# Patient Record
Sex: MALE | Race: WHITE | NOT HISPANIC OR LATINO | Employment: FULL TIME | URBAN - METROPOLITAN AREA
[De-identification: names, ages, dates, MRNs, and addresses within clinical notes are randomized per-mention and may not be internally consistent; named-entity substitution may affect disease eponyms.]

---

## 2022-12-02 ENCOUNTER — OFFICE VISIT (OUTPATIENT)
Dept: ORTHOPEDICS | Facility: CLINIC | Age: 42
End: 2022-12-02
Payer: COMMERCIAL

## 2022-12-02 ENCOUNTER — ANCILLARY PROCEDURE (OUTPATIENT)
Dept: GENERAL RADIOLOGY | Facility: CLINIC | Age: 42
End: 2022-12-02
Attending: PEDIATRICS
Payer: COMMERCIAL

## 2022-12-02 VITALS
SYSTOLIC BLOOD PRESSURE: 130 MMHG | DIASTOLIC BLOOD PRESSURE: 84 MMHG | BODY MASS INDEX: 23.1 KG/M2 | WEIGHT: 180 LBS | HEIGHT: 74 IN

## 2022-12-02 DIAGNOSIS — M79.645 FINGER PAIN, LEFT: ICD-10-CM

## 2022-12-02 DIAGNOSIS — S62.655A CLOSED NONDISPLACED FRACTURE OF MIDDLE PHALANX OF LEFT RING FINGER, INITIAL ENCOUNTER: ICD-10-CM

## 2022-12-02 DIAGNOSIS — M79.645 FINGER PAIN, LEFT: Primary | ICD-10-CM

## 2022-12-02 PROCEDURE — 99203 OFFICE O/P NEW LOW 30 MIN: CPT | Performed by: PEDIATRICS

## 2022-12-02 PROCEDURE — 73140 X-RAY EXAM OF FINGER(S): CPT | Mod: TC | Performed by: RADIOLOGY

## 2022-12-02 NOTE — PROGRESS NOTES
ASSESSMENT & PLAN    Jared was seen today for pain.    Diagnoses and all orders for this visit:    Finger pain, left  -     XR Finger Left G/E 2 Views; Future    Closed nondisplaced fracture of middle phalanx of left ring finger, initial encounter      Already 1 week out from injury unsupported with nondisplaced fracture.  Discussed potential for joint stiffness, arthrosis in future.  Plan support for finger, with recheck in 2-3 weeks.  Questions answered. Discussed signs and symptoms that may indicate more serious issues; the patient was instructed to seek appropriate care if noted. Jared indicates understanding of these issues and agrees with the plan.        See Patient Instructions  Patient Instructions   Interestingly, the left ring finger x-rays today demonstrate nondisplaced fracture at the volar base of the middle phalanx of the ring finger.  Suspect this happened with tossing out the trash.  Discussed potential for healing and improvement with time, though note that some joint stiffness and swelling can remain, and there is future potential for joint arthritis.  Discussed use of splint to help protect the finger over the next couple of weeks.  Preet taping is a reasonable alternative, but is going to be more active with your hand, such as when at work, would suggest using the splint.  Provided a letter regarding work.  We discussed tentative recheck in 2 to 3 weeks, with repeat x-rays (more of a clinical assessment, but also some radiographic assessment at that time).      If you have any further questions for your physician or physician s care team you can contact them thru MyChart or by calling  [phone] and use option 3 to leave a voice message.   Messages received during business hours will be returned same day.          Blu Turner [facility]    -----  Chief Complaint   Patient presents with     Left Ring Finger - Pain       SUBJECTIVE  Jared Cantu  "is a/an 42 year old male who is seen as a self referral for evaluation of left ring finger pain.  Pain has been present for about 1 week.  Started out as an ache, and then when he went to grab something he had an increase in pain.   Pain and swelling over the PIP joint.  Unable to get his ring off.   Pain with flexion, and with lateral motions.    Has a HX of gout in right hand, in his thumb.  .     The patient is seen by themselves.  The patient is Right handed    Onset: 1 week(s) ago. Reports insidious onset without acute precipitating event.  Location of Pain: left ring finger PIP joint   Worsened by: use, flexion, lateral motions   Better with: rest   Treatments tried: rest/activity avoidance and ice  Associated symptoms: numbness    Orthopedic/Surgical history: NO  Social History/Occupation: manufacturing.          **  Initially not sure how injury could have happened. However, thinks injury may have come from when taking trash out night of onset of pain, carrying with left hand, pulling sensation of string from the trash can. Otherwise no injury--didn't fall, didn't slip, didn't hit anything.    History gout right hand, so questioned initially whether potentially could be that in left hand. However, course with this ring finger injury is different from past gout flares.    REVIEW OF SYSTEMS:  Review of Systems      OBJECTIVE:  /84   Ht 1.88 m (6' 2\")   Wt 81.6 kg (180 lb)   BMI 23.11 kg/m     General: healthy, alert and in no distress  HEENT: no scleral icterus or conjunctival erythema  Skin: no suspicious lesions or rash. No jaundice.  CV: distal perfusion intact   Resp: normal respiratory effort without conversational dyspnea   Psych: normal mood and affect  Gait: NORMAL  Neuro: Normal light sensory exam of extremity       Hand/wrist (left):    Inspection:  No deformity noted.  Mild-mod swelling ring finger PIP joint.    Motion:  Digit motion mod limitation composite flexion ring finger, stiffness " and some pain noted PIP joint; extension nearly full, mild stiffness noted  No rotational deformity    Strength:  deferred    Sensation:  Grossly intact light touch.    Radial pulses normal, +2/4, capillary refill brisk.    Palpation:  Tender palmar aspect PIP joint ring finger  Nontender remainder, including other aspects PIP joint ring finger      RADIOLOGY:  I independently ordered, visualized and reviewed these images with the patient  Lucency volar base middle phalanx finger finger consistent with nondisplaced intra-articular fracture. Soft tissue swelling about the PIP joint ring finger.    Recent Results (from the past 24 hour(s))   XR Finger Left G/E 2 Views    Narrative    XR FINGER LEFT G/E 2 VIEWS 12/2/2022 1:31 PM    HISTORY: Finger pain, left    COMPARISON: None.      Impression    IMPRESSION: Small nondisplaced fracture of the volar aspect of the  base of the middle phalanx of the left ring finger with mild  surrounding soft tissue swelling.    VAZQUEZ ANTONIO MD         SYSTEM ID:  [id number]

## 2022-12-02 NOTE — LETTER
12/2/2022         RE: Jared Cantu  [address]  [place] [address]        Dear Colleague,    Thank you for referring your patient, Jared Cantu, to the [facility] [place]. Please see a copy of my visit note below.    ASSESSMENT & PLAN    Jared was seen today for pain.    Diagnoses and all orders for this visit:    Finger pain, left  -     XR Finger Left G/E 2 Views; Future    Closed nondisplaced fracture of middle phalanx of left ring finger, initial encounter      Already 1 week out from injury unsupported with nondisplaced fracture.  Discussed potential for joint stiffness, arthrosis in future.  Plan support for finger, with recheck in 2-3 weeks.  Questions answered. Discussed signs and symptoms that may indicate more serious issues; the patient was instructed to seek appropriate care if noted. Jared indicates understanding of these issues and agrees with the plan.        See Patient Instructions  Patient Instructions   Interestingly, the left ring finger x-rays today demonstrate nondisplaced fracture at the volar base of the middle phalanx of the ring finger.  Suspect this happened with tossing out the trash.  Discussed potential for healing and improvement with time, though note that some joint stiffness and swelling can remain, and there is future potential for joint arthritis.  Discussed use of splint to help protect the finger over the next couple of weeks.  Preet taping is a reasonable alternative, but is going to be more active with your hand, such as when at work, would suggest using the splint.  Provided a letter regarding work.  We discussed tentative recheck in 2 to 3 weeks, with repeat x-rays (more of a clinical assessment, but also some radiographic assessment at that time).      If you have any further questions for your physician or physician s care team you can contact them thru [employer] or by calling  [phone] and use option 3 to leave a voice  "message.   Messages received during business hours will be returned same day.          Blu Turner DO  [facility]    -----  Chief Complaint   Patient presents with     Left Ring Finger - Pain       SUBJECTIVE  Jared Cantu is a/an 42 year old male who is seen as a self referral for evaluation of left ring finger pain.  Pain has been present for about 1 week.  Started out as an ache, and then when he went to grab something he had an increase in pain.   Pain and swelling over the PIP joint.  Unable to get his ring off.   Pain with flexion, and with lateral motions.    Has a HX of gout in right hand, in his thumb.  .     The patient is seen by themselves.  The patient is Right handed    Onset: 1 week(s) ago. Reports insidious onset without acute precipitating event.  Location of Pain: left ring finger PIP joint   Worsened by: use, flexion, lateral motions   Better with: rest   Treatments tried: rest/activity avoidance and ice  Associated symptoms: numbness    Orthopedic/Surgical history: NO  Social History/Occupation: manufacturing.          **  Initially not sure how injury could have happened. However, thinks injury may have come from when taking trash out night of onset of pain, carrying with left hand, pulling sensation of string from the trash can. Otherwise no injury--didn't fall, didn't slip, didn't hit anything.    History gout right hand, so questioned initially whether potentially could be that in left hand. However, course with this ring finger injury is different from past gout flares.    REVIEW OF SYSTEMS:  Review of Systems      OBJECTIVE:  /84   Ht 1.88 m (6' 2\")   Wt 81.6 kg (180 lb)   BMI 23.11 kg/m     General: healthy, alert and in no distress  HEENT: no scleral icterus or conjunctival erythema  Skin: no suspicious lesions or rash. No jaundice.  CV: distal perfusion intact   Resp: normal respiratory effort without conversational dyspnea "   Psych: normal mood and affect  Gait: NORMAL  Neuro: Normal light sensory exam of extremity       Hand/wrist (left):    Inspection:  No deformity noted.  Mild-mod swelling ring finger PIP joint.    Motion:  Digit motion mod limitation composite flexion ring finger, stiffness and some pain noted PIP joint; extension nearly full, mild stiffness noted  No rotational deformity    Strength:  deferred    Sensation:  Grossly intact light touch.    Radial pulses normal, +2/4, capillary refill brisk.    Palpation:  Tender palmar aspect PIP joint ring finger  Nontender remainder, including other aspects PIP joint ring finger      RADIOLOGY:  I independently ordered, visualized and reviewed these images with the patient  Lucency volar base middle phalanx finger finger consistent with nondisplaced intra-articular fracture. Soft tissue swelling about the PIP joint ring finger.    Recent Results (from the past 24 hour(s))   XR Finger Left G/E 2 Views    Narrative    XR FINGER LEFT G/E 2 VIEWS 12/2/2022 1:31 PM    HISTORY: Finger pain, left    COMPARISON: None.      Impression    IMPRESSION: Small nondisplaced fracture of the volar aspect of the  base of the middle phalanx of the left ring finger with mild  surrounding soft tissue swelling.    VAZQUEZ ANTONIO MD         SYSTEM ID:  [id number]               Again, thank you for allowing me to participate in the care of your patient.        Sincerely,        Blu Turner DO

## 2022-12-02 NOTE — PATIENT INSTRUCTIONS
Interestingly, the left ring finger x-rays today demonstrate nondisplaced fracture at the volar base of the middle phalanx of the ring finger.  Suspect this happened with tossing out the trash.  Discussed potential for healing and improvement with time, though note that some joint stiffness and swelling can remain, and there is future potential for joint arthritis.  Discussed use of splint to help protect the finger over the next couple of weeks.  Preet taping is a reasonable alternative, but is going to be more active with your hand, such as when at work, would suggest using the splint.  Provided a letter regarding work.  We discussed tentative recheck in 2 to 3 weeks, with repeat x-rays (more of a clinical assessment, but also some radiographic assessment at that time).      If you have any further questions for your physician or physician s care team you can contact them thru [employer] or by calling  [phone] and use option 3 to leave a voice message.   Messages received during business hours will be returned same day.

## 2022-12-02 NOTE — PROGRESS NOTES
"ASSESSMENT & PLAN    Jared was seen today for pain.    Diagnoses and all orders for this visit:    Finger pain, left  -     XR Finger Left G/E 2 Views; Future      This issue is {ACUTE/CHRONIC:146597} and {IMPROVING WORSENIN}.      {FOLLOW UP PLANS (Optional):997746}    DO [facility]    -----  Chief Complaint   Patient presents with     Left Ring Finger - Pain       SUBJECTIVE  Jared Cantu is a/an 42 year old male who is seen {FSOC CONSULT:427117} for evaluation of ***.     The patient is seen {sjaparent:980196}.  The patient is {HANDDOMINANCE:379764} handed    Onset: {sjadays/weeks/months/years:449256}. {Precipitating Event:312062}  Location of Pain: {side:5002} ***  Worsened by: ***  Better with: ***  Treatments tried: {sjatreatmentoptions:624917}  Associated symptoms: {thassociatedsx:628682}    Orthopedic/Surgical history: {YES - DATE/NO:452192::\"NO\"}  Social History/Occupation: ***    No family history pertinent to patient's problem today. ***    REVIEW OF SYSTEMS:  Review of Systems  {ROS COMP (Optional):395637}    OBJECTIVE:  /84   Ht 1.88 m (6' 2\")   Wt 81.6 kg (180 lb)   BMI 23.11 kg/m     General: healthy, alert and in no distress  HEENT: no scleral icterus or conjunctival erythema  Skin: no suspicious lesions or rash. No jaundice.  CV: distal perfusion intact ***  Resp: normal respiratory effort without conversational dyspnea   Psych: normal mood and affect  Gait: {FSOC GAIT:891026::\"NORMAL\"}  Neuro: Normal light sensory exam of *** extremity ***    ***     RADIOLOGY:  I independently *** ordered, visualized and reviewed these images with the patient  ***      {[facility]  Documentation (Optional):250301}  { E&M time (Optional):836419}  {Provider  Link to [facility] Help Grid :972264}       "

## 2022-12-02 NOTE — LETTER
[facility]  [address]  [place] [address]  Phone: [phone]  Fax: [phone]      December 2, 2022      RE: Jared Cantu  [address]   [place] [address]        To whom it may concern:    Jared Cantu was seen in clinic today. The employee is ABLE to return to work next scheduled work date.    When the patient returns to work, the following restrictions apply for 3 weeks:  Reach Above Shoulders: Not at all (0 hours) on left  Lift, carry, push, and pull: up to 5 lbs on left, avoiding doing so repetitively. May use left hand as helper for right if pain free.      Sincerely,            Blu Turner DO, CAQ

## 2023-01-06 ENCOUNTER — ANCILLARY PROCEDURE (OUTPATIENT)
Dept: GENERAL RADIOLOGY | Facility: CLINIC | Age: 43
End: 2023-01-06
Attending: PEDIATRICS
Payer: COMMERCIAL

## 2023-01-06 ENCOUNTER — OFFICE VISIT (OUTPATIENT)
Dept: ORTHOPEDICS | Facility: CLINIC | Age: 43
End: 2023-01-06
Payer: COMMERCIAL

## 2023-01-06 VITALS — HEART RATE: 68 BPM | DIASTOLIC BLOOD PRESSURE: 66 MMHG | SYSTOLIC BLOOD PRESSURE: 116 MMHG

## 2023-01-06 DIAGNOSIS — S62.655D CLOSED NONDISPLACED FRACTURE OF MIDDLE PHALANX OF LEFT RING FINGER WITH ROUTINE HEALING, SUBSEQUENT ENCOUNTER: Primary | ICD-10-CM

## 2023-01-06 PROCEDURE — 73140 X-RAY EXAM OF FINGER(S): CPT | Mod: TC | Performed by: RADIOLOGY

## 2023-01-06 PROCEDURE — 99213 OFFICE O/P EST LOW 20 MIN: CPT | Performed by: PEDIATRICS

## 2023-01-06 ASSESSMENT — PAIN SCALES - GENERAL: PAINLEVEL: NO PAIN (0)

## 2023-01-06 NOTE — PROGRESS NOTES
ASSESSMENT & PLAN    Diagnoses and all orders for this visit:    Closed nondisplaced fracture of middle phalanx of left ring finger with routine healing, subsequent encounter  -     XR Finger LT G/E 2 vw          See Patient Instructions  Patient Instructions   Good to hear of the improvement that you have had.  Not surprising still to have swelling and limited mobility in this area, given nature of the injury.  X-rays of the finger today show once again fracture at the volar base of the middle phalanx of the ring finger.  Fracture lucency is better seen today, which could represent minimal interval displacement, though I do not think overall positioning has significantly changed.  With this injury, anticipate ongoing joint swelling, and there may be persistent stiffness in the finger, as well as future development of arthritis.  However, given improvement, we discussed johnathan taping for support with activities as desired.  Discussed rehab approach; home exercises reviewed today.  If interested in referral to hand therapy, contact clinic.  Plan to monitor 3 to 4 weeks additionally, with working on exercises.  Contact clinic if any questions or concerns in the meantime.    If you have any further questions for your physician or physician s care team you can contact them thru MyChart or by calling  [phone] and use option 3 to leave a voice message.   Messages received during business hours will be returned same day.          [facility]    SUBJECTIVE- Interim History January 6, 2023    No chief complaint on file.      Jared Cantu is a 42 year old male who is seen in f/u up for Left ringer finger. Since last visit on 12/2/2022 patient has  No new injury.  - Now ~ 6 weeks from initial onset    Worsened by: bumping it  Better with: johnathan taping  Treatments tried: casting/splinting/bracing  Associated symptoms:  swelling    The patient is Right  handed    Orthopedic/Surgical history: No  Social History/Occupation: manufactoring    No family history pertinent to patient's problem today.    **  Overall pain improved compared to last visit.  Splinted for period of time.   Still with limited flexion at ring finger, with persistent swelling.  Here for follow-up today.    REVIEW OF SYSTEMS:  Review of Systems      OBJECTIVE:  /66   Pulse 68        Hand/wrist (left):    Inspection:  No deformity noted.  Mild-mod swelling ring finger PIP joint.    Motion:  Digit motion mild-mod limitation composite flexion ring finger, stiffness and some pain noted PIP joint; extension grossly intact  No rotational deformity    Strength:  deferred          RADIOLOGY:  Final results and radiologist's interpretation, available in the [facility] health record.  Images were reviewed with the patient in the office today.  My personal interpretation of the performed imaging: again note fracture volar base middle phalanx. Appears some subtle signs of healing along fracture site, but lucency remains visible.      XR Finger LT G/E 2 vw    Narrative    FINGER LEFT TWO OR MORE VIEWS   1/6/2023 10:25 AM     HISTORY:  Closed nondisplaced fracture of middle phalanx of left ring  finger, initial encounter.    COMPARISON: Radiographs from 12/2/2022.      Impression    IMPRESSION: The minimally displaced fracture of the base of middle  phalanx of the ring finger and the adjacent soft tissue swelling are  similar in appearance when compared to the prior study. It is unclear  whether or not there is bridging callus/bone.     YANETH SELLERS MD         SYSTEM ID:  [id number]

## 2023-01-06 NOTE — LETTER
1/6/2023         RE: Jared Cantu  [address]  [place] [address]        Dear Colleague,    Thank you for referring your patient, Jared Cantu, to the [facility]. Please see a copy of my visit note below.    ASSESSMENT & PLAN    Diagnoses and all orders for this visit:    Closed nondisplaced fracture of middle phalanx of left ring finger with routine healing, subsequent encounter  -     XR Finger LT G/E 2 vw          See Patient Instructions  Patient Instructions   Good to hear of the improvement that you have had.  Not surprising still to have swelling and limited mobility in this area, given nature of the injury.  X-rays of the finger today show once again fracture at the volar base of the middle phalanx of the ring finger.  Fracture lucency is better seen today, which could represent minimal interval displacement, though I do not think overall positioning has significantly changed.  With this injury, anticipate ongoing joint swelling, and there may be persistent stiffness in the finger, as well as future development of arthritis.  However, given improvement, we discussed johnathan taping for support with activities as desired.  Discussed rehab approach; home exercises reviewed today.  If interested in referral to hand therapy, contact clinic.  Plan to monitor 3 to 4 weeks additionally, with working on exercises.  Contact clinic if any questions or concerns in the meantime.    If you have any further questions for your physician or physician s care team you can contact them thru MyChart or by calling  [phone] and use option 3 to leave a voice message.   Messages received during business hours will be returned same day.          Blu Turner DO  [facility]    SUBJECTIVE- Interim History January 6, 2023    No chief complaint on file.      Jared Cantu is a 42 year old male who is seen in f/u up for Left ringer  finger. Since last visit on 12/2/2022 patient has  No new injury.  - Now ~ 6 weeks from initial onset    Worsened by: bumping it  Better with: johnathan taping  Treatments tried: casting/splinting/bracing  Associated symptoms:  swelling    The patient is Right handed    Orthopedic/Surgical history: No  Social History/Occupation: manufactoring    No family history pertinent to patient's problem today.    **  Overall pain improved compared to last visit.  Splinted for period of time.   Still with limited flexion at ring finger, with persistent swelling.  Here for follow-up today.    REVIEW OF SYSTEMS:  Review of Systems      OBJECTIVE:  /66   Pulse 68        Hand/wrist (left):    Inspection:  No deformity noted.  Mild-mod swelling ring finger PIP joint.    Motion:  Digit motion mild-mod limitation composite flexion ring finger, stiffness and some pain noted PIP joint; extension grossly intact  No rotational deformity    Strength:  deferred          RADIOLOGY:  Final results and radiologist's interpretation, available in the [facility] health record.  Images were reviewed with the patient in the office today.  My personal interpretation of the performed imaging: again note fracture volar base middle phalanx. Appears some subtle signs of healing along fracture site, but lucency remains visible.      XR Finger LT G/E 2 vw    Narrative    FINGER LEFT TWO OR MORE VIEWS   1/6/2023 10:25 AM     HISTORY:  Closed nondisplaced fracture of middle phalanx of left ring  finger, initial encounter.    COMPARISON: Radiographs from 12/2/2022.      Impression    IMPRESSION: The minimally displaced fracture of the base of middle  phalanx of the ring finger and the adjacent soft tissue swelling are  similar in appearance when compared to the prior study. It is unclear  whether or not there is bridging callus/bone.     YANETH SELLERS MD         SYSTEM ID:  [id number]               Again, thank you for allowing me to participate in the  care of your patient.        Sincerely,        Blu Turner, DO

## 2023-01-06 NOTE — PATIENT INSTRUCTIONS
Good to hear of the improvement that you have had.  Not surprising still to have swelling and limited mobility in this area, given nature of the injury.  X-rays of the finger today show once again fracture at the volar base of the middle phalanx of the ring finger.  Fracture lucency is better seen today, which could represent minimal interval displacement, though I do not think overall positioning has significantly changed.  With this injury, anticipate ongoing joint swelling, and there may be persistent stiffness in the finger, as well as future development of arthritis.  However, given improvement, we discussed johnathan taping for support with activities as desired.  Discussed rehab approach; home exercises reviewed today.  If interested in referral to hand therapy, contact clinic.  Plan to monitor 3 to 4 weeks additionally, with working on exercises.  Contact clinic if any questions or concerns in the meantime.    If you have any further questions for your physician or physician s care team you can contact them thru [employer] or by calling  [phone] and use option 3 to leave a voice message.   Messages received during business hours will be returned same day.

## 2024-06-06 ENCOUNTER — VIRTUAL VISIT (OUTPATIENT)
Dept: FAMILY MEDICINE | Facility: CLINIC | Age: 44
End: 2024-06-06
Payer: COMMERCIAL

## 2024-06-06 DIAGNOSIS — F41.9 ANXIETY: ICD-10-CM

## 2024-06-06 DIAGNOSIS — F90.9 ATTENTION DEFICIT HYPERACTIVITY DISORDER (ADHD), UNSPECIFIED ADHD TYPE: Primary | ICD-10-CM

## 2024-06-06 DIAGNOSIS — R41.840 LACK OF CONCENTRATION: ICD-10-CM

## 2024-06-06 PROCEDURE — 99443 PR PHYSICIAN TELEPHONE EVALUATION 21-30 MIN: CPT | Mod: 95 | Performed by: INTERNAL MEDICINE

## 2024-06-06 RX ORDER — DEXTROAMPHETAMINE SACCHARATE, AMPHETAMINE ASPARTATE, DEXTROAMPHETAMINE SULFATE AND AMPHETAMINE SULFATE 1.25; 1.25; 1.25; 1.25 MG/1; MG/1; MG/1; MG/1
5 TABLET ORAL 2 TIMES DAILY
Qty: 60 TABLET | Refills: 0 | Status: SHIPPED | OUTPATIENT
Start: 2024-06-06 | End: 2024-07-03

## 2024-06-06 NOTE — PROGRESS NOTES
Jared is a 44 year old who is being evaluated via a billable telephone visit.    How would you like to obtain your AVS? MyChart    Originating Location (pt. Location): Home    Distant Location (provider location):  On-site    Subjective   Jared is a 44 year old, presenting for the following health issues:    HPI       Chief Complaint:     ADHD    HPI:   Patient Jared Cantu is a very pleasant 44 year old male with history of ADHD, anxiety who presents to Internal Medicine clinic today for telephone virtual visit for follow up of poorly controlled chronic ADHD symptoms.       Current Medications:     Current Outpatient Medications   Medication Sig Dispense Refill    amphetamine-dextroamphetamine (ADDERALL) 5 MG tablet Take 1 tablet (5 mg) by mouth 2 times daily for 30 days 60 tablet 0    sertraline (ZOLOFT) 50 MG tablet Take 50 mg by mouth daily           Allergies:    No Known Allergies         Past Medical History:     Past Medical History:   Diagnosis Date    ADHD (attention deficit hyperactivity disorder)     Anxiety          Past Surgical History:   No past surgical history on file.      Family Medical History:   No family history on file.      Social History:     Social History     Socioeconomic History    Marital status:      Spouse name: Not on file    Number of children: Not on file    Years of education: Not on file    Highest education level: Not on file   Occupational History    Not on file   Tobacco Use    Smoking status: Some Days     Types: Vaping Device    Smokeless tobacco: Never   Substance and Sexual Activity    Alcohol use: Not on file    Drug use: Not on file    Sexual activity: Not on file   Other Topics Concern    Not on file   Social History Narrative    Not on file     Social Determinants of Health     Financial Resource Strain: Not on file   Food Insecurity: Not on file   Transportation Needs: Not on file   Physical Activity: Not on file   Stress: Not on file   Social Connections:  Unknown (12/14/2022)    Received from [employer] [facility]    Social Connections     Frequency of Communication with Friends and Family: Not on file   Interpersonal Safety: Not on file   Housing Stability: Not on file           Review of System:     Constitutional: Negative for fever or chills  Skin: Negative for rashes  Ears/Nose/Throat: Negative for nasal congestion, sore throat  Respiratory: No shortness of breath, dyspnea on exertion, cough, or hemoptysis  Cardiovascular: Negative for chest pain  Gastrointestinal: Negative for nausea, vomiting  Genitourinary: Negative for dysuria, hematuria  Musculoskeletal: Negative for myalgias  Neurologic: Negative for headaches  Psychiatric: positive for ADHD, anxiety  Hematologic/Lymphatic/Immunologic: Negative  Endocrine: Negative  Behavioral: Negative for tobacco use       Physical Exam:   There were no vitals taken for this visit.  RESP: no cough present  NEURO: Alert & Oriented x 3.   PSYCH: mentation appears normal        Diagnostic Test Results:     Diagnostic Test Results:  Labs reviewed in Epic    ASSESSMENT/PLAN:       Jared was seen today for establish care.    Diagnoses and all orders for this visit:    Attention deficit hyperactivity disorder (ADHD), unspecified ADHD type  -   symptoms not well controlled  -  amphetamine-dextroamphetamine (ADDERALL) 5 MG tablet; Take 1 tablet (5 mg) by mouth 2 times daily for 30 days    Anxiety  - stable, continue Zoloft     Lack of concentration  -     amphetamine-dextroamphetamine (ADDERALL) 5 MG tablet; Take 1 tablet (5 mg) by mouth 2 times daily for 30 days            Follow Up Plan:     Patient is instructed to return to Internal Medicine clinic for follow-up visit in 1 month.        Bharati Elam MD  Internal Medicine  [facility]      Telephone Visit duration including chart review medication management: 22 minutes  Signed Electronically by: Bharati Elam MD

## 2024-06-08 ENCOUNTER — MYC MEDICAL ADVICE (OUTPATIENT)
Dept: FAMILY MEDICINE | Facility: CLINIC | Age: 44
End: 2024-06-08
Payer: COMMERCIAL

## 2024-06-08 DIAGNOSIS — F41.9 ANXIETY: Primary | ICD-10-CM

## 2024-07-03 ENCOUNTER — VIRTUAL VISIT (OUTPATIENT)
Dept: FAMILY MEDICINE | Facility: CLINIC | Age: 44
End: 2024-07-03
Payer: COMMERCIAL

## 2024-07-03 DIAGNOSIS — R41.840 LACK OF CONCENTRATION: ICD-10-CM

## 2024-07-03 DIAGNOSIS — F41.9 ANXIETY: ICD-10-CM

## 2024-07-03 DIAGNOSIS — F90.9 ATTENTION DEFICIT HYPERACTIVITY DISORDER (ADHD), UNSPECIFIED ADHD TYPE: Primary | ICD-10-CM

## 2024-07-03 PROCEDURE — 99443 PR PHYSICIAN TELEPHONE EVALUATION 21-30 MIN: CPT | Mod: 95 | Performed by: INTERNAL MEDICINE

## 2024-07-03 RX ORDER — DEXTROAMPHETAMINE SACCHARATE, AMPHETAMINE ASPARTATE, DEXTROAMPHETAMINE SULFATE AND AMPHETAMINE SULFATE 2.5; 2.5; 2.5; 2.5 MG/1; MG/1; MG/1; MG/1
10 TABLET ORAL 2 TIMES DAILY
Qty: 60 TABLET | Refills: 0 | Status: SHIPPED | OUTPATIENT
Start: 2024-09-01 | End: 2024-10-01

## 2024-07-03 RX ORDER — SERTRALINE HYDROCHLORIDE 100 MG/1
100 TABLET, FILM COATED ORAL DAILY
Qty: 90 TABLET | Refills: 1 | Status: SHIPPED | OUTPATIENT
Start: 2024-07-03

## 2024-07-03 RX ORDER — DEXTROAMPHETAMINE SACCHARATE, AMPHETAMINE ASPARTATE, DEXTROAMPHETAMINE SULFATE AND AMPHETAMINE SULFATE 2.5; 2.5; 2.5; 2.5 MG/1; MG/1; MG/1; MG/1
10 TABLET ORAL 2 TIMES DAILY
Qty: 60 TABLET | Refills: 0 | Status: SHIPPED | OUTPATIENT
Start: 2024-08-02 | End: 2024-09-01

## 2024-07-03 RX ORDER — DEXTROAMPHETAMINE SACCHARATE, AMPHETAMINE ASPARTATE, DEXTROAMPHETAMINE SULFATE AND AMPHETAMINE SULFATE 2.5; 2.5; 2.5; 2.5 MG/1; MG/1; MG/1; MG/1
10 TABLET ORAL 2 TIMES DAILY
Qty: 60 TABLET | Refills: 0 | Status: SHIPPED | OUTPATIENT
Start: 2024-07-03 | End: 2024-08-02

## 2024-07-03 NOTE — PROGRESS NOTES
Jared is a 44 year old who is being evaluated via a billable telephone visit.    How would you like to obtain your AVS? MyChart    Originating Location (pt. Location): Home    Distant Location (provider location):  On-site    Subjective   Jared is a 44 year old, presenting for the following health issues:    HPI       Chief Complaint:     ADHD    HPI:   Patient Jared Cantu is a very pleasant 44 year old male with history of ADHD, anxiety who presents to Internal Medicine clinic today for telephone virtual visit for follow up of poorly controlled chronic ADHD symptoms.       Current Medications:     Current Outpatient Medications   Medication Sig Dispense Refill    amphetamine-dextroamphetamine (ADDERALL) 10 MG tablet Take 1 tablet (10 mg) by mouth 2 times daily for 30 days 60 tablet 0    [START ON 8/2/2024] amphetamine-dextroamphetamine (ADDERALL) 10 MG tablet Take 1 tablet (10 mg) by mouth 2 times daily for 30 days 60 tablet 0    [START ON 9/1/2024] amphetamine-dextroamphetamine (ADDERALL) 10 MG tablet Take 1 tablet (10 mg) by mouth 2 times daily for 30 days 60 tablet 0    sertraline (ZOLOFT) 100 MG tablet Take 1 tablet (100 mg) by mouth daily 90 tablet 1         Allergies:    No Known Allergies         Past Medical History:     Past Medical History:   Diagnosis Date    ADHD (attention deficit hyperactivity disorder)     Anxiety          Past Surgical History:   No past surgical history on file.      Family Medical History:   No family history on file.      Social History:     Social History     Socioeconomic History    Marital status:      Spouse name: Not on file    Number of children: Not on file    Years of education: Not on file    Highest education level: Not on file   Occupational History    Not on file   Tobacco Use    Smoking status: Some Days     Types: Vaping Device    Smokeless tobacco: Never   Substance and Sexual Activity    Alcohol use: Not on file    Drug use: Not on file    Sexual activity:  Not on file   Other Topics Concern    Not on file   Social History Narrative    Not on file     Social Determinants of Health     Financial Resource Strain: Not on file   Food Insecurity: Not on file   Transportation Needs: Not on file   Physical Activity: Not on file   Stress: Not on file   Social Connections: Unknown (12/14/2022)    Received from [employer] [facility]    Social Connections     Frequency of Communication with Friends and Family: Not on file   Interpersonal Safety: Not on file   Housing Stability: Not on file           Review of System:     Constitutional: Negative for fever or chills  Skin: Negative for rashes  Ears/Nose/Throat: Negative for nasal congestion, sore throat  Respiratory: No shortness of breath, dyspnea on exertion, cough, or hemoptysis  Cardiovascular: Negative for chest pain  Gastrointestinal: Negative for nausea, vomiting  Genitourinary: Negative for dysuria, hematuria  Musculoskeletal: Negative for myalgias  Neurologic: Negative for headaches  Psychiatric: positive for ADHD, anxiety  Hematologic/Lymphatic/Immunologic: Negative  Endocrine: Negative  Behavioral: Negative for tobacco use       Physical Exam:   There were no vitals taken for this visit.  RESP: no cough present  NEURO: Alert & Oriented x 3.   PSYCH: mentation appears normal        Diagnostic Test Results:     Diagnostic Test Results:  Labs reviewed in Epic    ASSESSMENT/PLAN:       Jared was seen today for follow up    Diagnoses and all orders for this visit:    Lack of concentration  Attention deficit hyperactivity disorder (ADHD), unspecified ADHD type  -   symptoms not well controlled  -  amphetamine-dextroamphetamine (ADDERALL) medication dose increased from 5 to 10 MG tablet; Take 1 tablet (10 mg) by mouth 2 times daily    Anxiety  - stable, medication refilled today for Zoloft 100mg          Follow Up Plan:     Patient is instructed to return to Internal Medicine clinic for follow-up visit in  3 months.        Bharati Elam MD  Internal Medicine  [facility]      Telephone Visit duration including chart review medication management: 23 minutes  Signed Electronically by: Bharati Elam MD

## 2024-07-10 NOTE — PROGRESS NOTES
ASSESSMENT & PLAN    Jared was seen today for pain and pain.    Diagnoses and all orders for this visit:    Injury of right shoulder, initial encounter  -     XR Shoulder Right G/E 3 Views; Future    Left knee pain, unspecified chronicity  -     XR Knee Standing AP [place] Bilat Lat Left; Future    Strain of right shoulder, initial encounter    Patellofemoral pain syndrome of left knee        See Patient Instructions  Patient Instructions   Right shoulder:  Consistent with initial strain, ongoing soreness, related to rotator cuff.  Tear not likely clinically.  Discussed rehab approach; home exercises reviewed today.  If interested in referral to physical therapy, contact clinic.  We discussed consideration of additional imaging with MRI, not required currently.  Monitor 1 month with home exercises to begin.  If interested in referral to physical therapy, contact clinic.  Otherwise, contact clinic any other questions or concerns along the way.    Left knee:  Consistent with patellofemoral syndrome.  Home exercises reviewed today.  If interested in referral to physical therapy, contact clinic.  Briefly discussed consideration of patellar stabilizing support.  Start by focusing on rehab approach.  No additional imaging required currently.  Monitor 1 month with home exercises to begin.    If you have any further questions for your physician or physician s care team you can contact them thru [employer] or by calling [phone].      Blu Turner [facility]    -----  Chief Complaint   Patient presents with    Right Shoulder - Pain    Left Knee - Pain       SUBJECTIVE  Jared CAROLYN Cantu is a/an 44 year old male who is seen as a self referral for evaluation of Right shoulder and left knee.     The patient is seen by themselves.  The patient is Right handed    Onset: 3 month(s) ago for both body parts. Reports insidious onset without acute precipitating event. Reports that he was  doing a lot of activities in [place] at this time, doing swimming, boxing and rock climbing and more. Does recall being pulled by his arm in a pool by a poolside basketball hoop that was falling over.  Location of Pain: right shoulder diffuse around lateral shoulder  Left knee: Chronic, diffuse deep inside the knee, posterior as well  Worsened by: Right shoulder: Reaching behind, external rotation  Left knee: Standing up from a chair, bending the knee, squatting  Better with:   Treatments tried: no treatment tried to date  Associated symptoms: no distal numbness or tingling; denies swelling or warmth    Orthopedic/Surgical history: NO  Social History/Occupation: Semi-conductor     **  Above information per rooming staff.  Additional history:  Right shoulder:  Thinks started with the basketball in the pool incident. Recalls trying to hold onto hoop as it fell. However, not certain was from that.  The other activities noted above were prior to the incident in the pool, by a couple weeks.  Pain more posterolateral shoulder, notes with reaching behind self. Pain with donning jacket. Also repetitive motions.  Recalls one incident with pop with coworker pressing on chest and upper back, otherwise no noise.      Left knee:  Also about 3 months.  Pain is deep. Notes if forcefully flexing knee, primarily posterior knee pain, also some anterior pain. With full squat to floor then more pain.  Generally ok to walk and stand without issue. However, if sit to stand, squat, or some other positions then gets pain.                  REVIEW OF SYSTEMS:  Review of Systems    OBJECTIVE:  Wt 81.6 kg (180 lb)   BMI 23.11 kg/m           Right Shoulder exam    ROM:      forward flexion grossly symmetric, ~170, some tightness right, some pain       abduction grossly symmetric, ~160-170, some pain       internal rotation lacks 2-3 vertebral segments compared to left, with pain       external rotation pain end range, grossly  symmetric    Strength:      abduction 5/5       internal rotation 5/5       external rotation 5-/5  Pain with ER    Impingement testing:      positive (+) Cheung       positive (+) empty can         Some pain with O'michael    Stability testing:      neg (-) posterior compression    Skin:      no visible deformities       well perfused       capillary refill brisk    Sensation:      normal sensation over shoulder and upper extremity         Left Knee exam    Inspection:   no effusion   no ecchymosis    ROM:      Full active and passive ROM with flexion and extension    Patellar Motion:      Normal patellar tracking noted through range of motion    Tender: none focal    Special Tests:      neg (-) Nery       neg (-) Lachman       neg (-) anterior drawer       neg (-) posterior drawer       neg (-) varus       neg (-) valgus       no pain with forced extension    Evaluation of ipsilateral kinetic chain:   Anterior knee excursion with mini squat, with some pain      RADIOLOGY:  Final results and radiologist's interpretation, available in the [facility] health record.  Images were reviewed with the patient in the office today.  My personal interpretation of the performed imaging: no acute bony abnormality noted at shoulder or knee. Joint spaces preserved.          Recent Results (from the past 24 hour(s))   XR Shoulder Right G/E 3 Views    Narrative    XR SHOULDER RIGHT G/E 3 VIEWS  7/12/2024 4:01 PM     HISTORY: Lateral shoulder pain with extension and external rotation;  Injury of right shoulder, initial encounter  COMPARISON: None      Impression    IMPRESSION: No acute fracture or malalignment. There is normal joint  spacing.     HERNAN LANDA MD         SYSTEM ID:  [id number]   XR Knee Standing AP [place] Bilat Lat Left    Narrative    XR KNEE STANDING AP [place] BILAT LAT LEFT  7/12/2024 4:01 PM     HISTORY: Left knee, diffuse, pain posterior especially with squatting;  Chronic pain of left knee  COMPARISON:  None      Impression    IMPRESSION: No acute fracture or malalignment. There is normal joint  spacing. No significant left knee joint effusion.     HERNAN LANDA MD         SYSTEM ID:  [id number]           Note: Time spent in one-on-one evaluation and discussion with the patient regarding the nature of problem, course, prior treatments, and therapeutic options, along with review of medical record (including outside sources/documentation), and completing documentation: 30 minutes.

## 2024-07-12 ENCOUNTER — OFFICE VISIT (OUTPATIENT)
Dept: ORTHOPEDICS | Facility: CLINIC | Age: 44
End: 2024-07-12
Payer: COMMERCIAL

## 2024-07-12 ENCOUNTER — ANCILLARY PROCEDURE (OUTPATIENT)
Dept: GENERAL RADIOLOGY | Facility: CLINIC | Age: 44
End: 2024-07-12
Attending: PEDIATRICS
Payer: COMMERCIAL

## 2024-07-12 VITALS — BODY MASS INDEX: 23.11 KG/M2 | WEIGHT: 180 LBS

## 2024-07-12 DIAGNOSIS — G89.29 CHRONIC PAIN OF LEFT KNEE: ICD-10-CM

## 2024-07-12 DIAGNOSIS — M25.562 LEFT KNEE PAIN, UNSPECIFIED CHRONICITY: ICD-10-CM

## 2024-07-12 DIAGNOSIS — S49.91XA INJURY OF RIGHT SHOULDER, INITIAL ENCOUNTER: ICD-10-CM

## 2024-07-12 DIAGNOSIS — M25.562 CHRONIC PAIN OF LEFT KNEE: ICD-10-CM

## 2024-07-12 DIAGNOSIS — S46.911A STRAIN OF RIGHT SHOULDER, INITIAL ENCOUNTER: ICD-10-CM

## 2024-07-12 DIAGNOSIS — S49.91XA INJURY OF RIGHT SHOULDER, INITIAL ENCOUNTER: Primary | ICD-10-CM

## 2024-07-12 DIAGNOSIS — M22.2X2 PATELLOFEMORAL PAIN SYNDROME OF LEFT KNEE: ICD-10-CM

## 2024-07-12 PROCEDURE — 73030 X-RAY EXAM OF SHOULDER: CPT | Mod: TC | Performed by: RADIOLOGY

## 2024-07-12 PROCEDURE — 99214 OFFICE O/P EST MOD 30 MIN: CPT | Performed by: PEDIATRICS

## 2024-07-12 PROCEDURE — 73562 X-RAY EXAM OF KNEE 3: CPT | Mod: TC | Performed by: RADIOLOGY

## 2024-07-12 NOTE — PATIENT INSTRUCTIONS
Right shoulder:  Consistent with initial strain, ongoing soreness, related to rotator cuff.  Tear not likely clinically.  Discussed rehab approach; home exercises reviewed today.  If interested in referral to physical therapy, contact clinic.  We discussed consideration of additional imaging with MRI, not required currently.  Monitor 1 month with home exercises to begin.  If interested in referral to physical therapy, contact clinic.  Otherwise, contact clinic any other questions or concerns along the way.    Left knee:  Consistent with patellofemoral syndrome.  Home exercises reviewed today.  If interested in referral to physical therapy, contact clinic.  Briefly discussed consideration of patellar stabilizing support.  Start by focusing on rehab approach.  No additional imaging required currently.  Monitor 1 month with home exercises to begin.    If you have any further questions for your physician or physician s care team you can contact them thru [employer] or by calling [phone].

## 2024-07-12 NOTE — LETTER
7/12/2024      Jared Cantu  [address]  [place] [address]      Dear Colleague,    Thank you for referring your patient, Jared Cantu, to the [facility]. Please see a copy of my visit note below.    ASSESSMENT & PLAN    Jared was seen today for pain and pain.    Diagnoses and all orders for this visit:    Injury of right shoulder, initial encounter  -     XR Shoulder Right G/E 3 Views; Future    Left knee pain, unspecified chronicity  -     XR Knee Standing AP [place] Bilat Lat Left; Future    Strain of right shoulder, initial encounter    Patellofemoral pain syndrome of left knee        See Patient Instructions  Patient Instructions   Right shoulder:  Consistent with initial strain, ongoing soreness, related to rotator cuff.  Tear not likely clinically.  Discussed rehab approach; home exercises reviewed today.  If interested in referral to physical therapy, contact clinic.  We discussed consideration of additional imaging with MRI, not required currently.  Monitor 1 month with home exercises to begin.  If interested in referral to physical therapy, contact clinic.  Otherwise, contact clinic any other questions or concerns along the way.    Left knee:  Consistent with patellofemoral syndrome.  Home exercises reviewed today.  If interested in referral to physical therapy, contact clinic.  Briefly discussed consideration of patellar stabilizing support.  Start by focusing on rehab approach.  No additional imaging required currently.  Monitor 1 month with home exercises to begin.    If you have any further questions for your physician or physician s care team you can contact them thru [employer] or by calling [phone].      Blu Turner DO  [facility]    -----  Chief Complaint   Patient presents with     Right Shoulder - Pain     Left Knee - Pain       SUBJECTIVE  Jared Cantu is a/an 44 year old male who is seen as a  self referral for evaluation of Right shoulder and left knee.     The patient is seen by themselves.  The patient is Right handed    Onset: 3 month(s) ago for both body parts. Reports insidious onset without acute precipitating event. Reports that he was doing a lot of activities in [place] at this time, doing swimming, boxing and rock climbing and more. Does recall being pulled by his arm in a pool by a poolside basketball hoop that was falling over.  Location of Pain: right shoulder diffuse around lateral shoulder  Left knee: Chronic, diffuse deep inside the knee, posterior as well  Worsened by: Right shoulder: Reaching behind, external rotation  Left knee: Standing up from a chair, bending the knee, squatting  Better with:   Treatments tried: no treatment tried to date  Associated symptoms: no distal numbness or tingling; denies swelling or warmth    Orthopedic/Surgical history: NO  Social History/Occupation: Semi-conductor     **  Above information per rooming staff.  Additional history:  Right shoulder:  Thinks started with the basketball in the pool incident. Recalls trying to hold onto hoop as it fell. However, not certain was from that.  The other activities noted above were prior to the incident in the pool, by a couple weeks.  Pain more posterolateral shoulder, notes with reaching behind self. Pain with donning jacket. Also repetitive motions.  Recalls one incident with pop with coworker pressing on chest and upper back, otherwise no noise.      Left knee:  Also about 3 months.  Pain is deep. Notes if forcefully flexing knee, primarily posterior knee pain, also some anterior pain. With full squat to floor then more pain.  Generally ok to walk and stand without issue. However, if sit to stand, squat, or some other positions then gets pain.                  REVIEW OF SYSTEMS:  Review of Systems    OBJECTIVE:  Wt 81.6 kg (180 lb)   BMI 23.11 kg/m           Right Shoulder exam    ROM:      forward  flexion grossly symmetric, ~170, some tightness right, some pain       abduction grossly symmetric, ~160-170, some pain       internal rotation lacks 2-3 vertebral segments compared to left, with pain       external rotation pain end range, grossly symmetric    Strength:      abduction 5/5       internal rotation 5/5       external rotation 5-/5  Pain with ER    Impingement testing:      positive (+) Cheung       positive (+) empty can         Some pain with O'michael    Stability testing:      neg (-) posterior compression    Skin:      no visible deformities       well perfused       capillary refill brisk    Sensation:      normal sensation over shoulder and upper extremity         Left Knee exam    Inspection:   no effusion   no ecchymosis    ROM:      Full active and passive ROM with flexion and extension    Patellar Motion:      Normal patellar tracking noted through range of motion    Tender: none focal    Special Tests:      neg (-) Nery       neg (-) Lachman       neg (-) anterior drawer       neg (-) posterior drawer       neg (-) varus       neg (-) valgus       no pain with forced extension    Evaluation of ipsilateral kinetic chain:   Anterior knee excursion with mini squat, with some pain      RADIOLOGY:  Final results and radiologist's interpretation, available in the [facility] health record.  Images were reviewed with the patient in the office today.  My personal interpretation of the performed imaging: no acute bony abnormality noted at shoulder or knee. Joint spaces preserved.          Recent Results (from the past 24 hour(s))   XR Shoulder Right G/E 3 Views    Narrative    XR SHOULDER RIGHT G/E 3 VIEWS  7/12/2024 4:01 PM     HISTORY: Lateral shoulder pain with extension and external rotation;  Injury of right shoulder, initial encounter  COMPARISON: None      Impression    IMPRESSION: No acute fracture or malalignment. There is normal joint  spacing.     HERNAN LANDA MD         SYSTEM ID:   [id number]   XR Knee Standing AP [place] Bilat Lat Left    Narrative    XR KNEE STANDING AP [place] BILAT LAT LEFT  7/12/2024 4:01 PM     HISTORY: Left knee, diffuse, pain posterior especially with squatting;  Chronic pain of left knee  COMPARISON: None      Impression    IMPRESSION: No acute fracture or malalignment. There is normal joint  spacing. No significant left knee joint effusion.     HERNAN LANDA MD         SYSTEM ID:  [id number]           Note: Time spent in one-on-one evaluation and discussion with the patient regarding the nature of problem, course, prior treatments, and therapeutic options, along with review of medical record (including outside sources/documentation), and completing documentation: 30 minutes.      Again, thank you for allowing me to participate in the care of your patient.        Sincerely,        Blu Turner DO

## 2024-07-28 ENCOUNTER — HEALTH MAINTENANCE LETTER (OUTPATIENT)
Age: 44
End: 2024-07-28

## 2025-01-05 DIAGNOSIS — F41.9 ANXIETY: ICD-10-CM

## 2025-01-06 RX ORDER — SERTRALINE HYDROCHLORIDE 100 MG/1
100 TABLET, FILM COATED ORAL DAILY
Qty: 30 TABLET | Refills: 5 | Status: SHIPPED | OUTPATIENT
Start: 2025-01-06

## 2025-08-10 ENCOUNTER — HEALTH MAINTENANCE LETTER (OUTPATIENT)
Age: 45
End: 2025-08-10